# Patient Record
Sex: FEMALE | URBAN - METROPOLITAN AREA
[De-identification: names, ages, dates, MRNs, and addresses within clinical notes are randomized per-mention and may not be internally consistent; named-entity substitution may affect disease eponyms.]

---

## 2022-12-19 ENCOUNTER — HOSPITAL ENCOUNTER (OUTPATIENT)
Dept: PHYSICAL THERAPY | Age: 37
Setting detail: RECURRING SERIES
Discharge: HOME | End: 2022-12-22
Payer: OTHER GOVERNMENT

## 2022-12-19 PROCEDURE — 97161 PT EVAL LOW COMPLEX 20 MIN: CPT

## 2022-12-19 PROCEDURE — 97110 THERAPEUTIC EXERCISES: CPT

## 2022-12-19 ASSESSMENT — PAIN SCALES - GENERAL: PAINLEVEL_OUTOF10: 0

## 2022-12-19 NOTE — THERAPY EVALUATION
Cherylene Azure  : 1985  Primary: Willian Manzo  (Other)  Secondary:  [address] @ [facility] [address] [address]  Phone: [phone]  Fax: [phone] Plan Frequency: one time  a week for 90 days  Plan of Care/Certification Expiration Date: 23    PT Visit Info:    Plan Frequency: one time  a week for 90 days  Plan of Care/Certification Expiration Date: 23    Visit Count:  1                OUTPATIENT PHYSICAL THERAPY:             OP NOTE TYPE: Initial Assessment 2022               Episode  Appt Desk         Treatment Diagnosis:  Lack of coordination (muscle incoordination) (R27.8)  Pelvic floor dysfunction in female (M62.98)  Generalized weakness (M62.81)  Stress incontinence (female) (male) (N39.3)  Medical/Referring Diagnosis:  Stress incontinence (female) (male) [N39.3]  Referring Physician:  WILL Barger NP, MD Orders:  PT Eval and Treat   Return MD Appt:  -  Date of Onset:       Allergies:  Patient has no allergy information on record. Restrictions/Precautions:           Medications Last Reviewed:  2022     SUBJECTIVE   History of Injury/Illness (Reason for Referral):  Ms. Renata Baird is a 31 yo female referred to pelvic floor physical therapy (PFPT) by FRANCISCO JAVIER Barger* 2/2 Stress incontinence (female) (male) [N39.3] Pt reports that symptoms began 1 year ago. Patient reports leakage is getting worse especially when she sneezes. Coughing is hit or miss. She feels like she doesn't have control of pelvic floor. She is 3 years post partum. She is in grad school for OT. She started an exercise program and thought she might have a prolapse. Urinary: Frequency 5-8 x/day, 1 x/night. Positive for stress urinary incontinence, incomplete emptying, urinary hesitancy/intermittency, and nocturia.    Negative for urge urinary incontinence, urinary urgency, urinary frequency, dysuria, hematuria, and nocturnal enuresis. Pt does not use pads for urinary protection; 0 pads per day (PPD). Fluid intake: 16 oz water/day; bladder irritants include: coffee (4 cups a day). Pt does not limit fluid intake due to bladder control. Bowel: Frequency once to twice a day. Positive for pushing/straining with bowel movement and constipation. Negative for pain with bowel movement, fecal incontinence, and incomplete emptying. Pt does not use pads for bowel protection; 0 pads per day (PPD). Use of stool softeners or laxatives? No    Sexual: Pt is  sexually active with male partners. Contraception/birth control: none. negative for dyspareunia. History of sexual abuse: No    Pelvic Organ Prolapse/Pelvic Pain: Location: none    Menstrual History: LMP: 11-27-22. Period Cycle (days):28 . Period duration (days): 5. Period pattern: regular (regular/irregular). Menstrual flow: moderate to heavy (light/moderate/heavy). OB History: Number of pregnancies: 2 Number of vaginal deliveries: 2 Number of c-sections: 0. Initial:     0/10 Post Session:      /10  Past Medical History/Comorbidities:   Ms. Renata Baird  has no past medical history on file. Ms. Renata Baird  has no past surgical history on file. 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  Leisure & Hobbies: none currently, used to do yoga, has get mom strong           Learning:   Does the patient/guardian have any barriers to learning?: No barriers  Will there be a co-learner?: No  What is the preferred language of the patient/guardian?: English  Is an  required?: No  How does the patient/guardian prefer to learn new concepts?: Listening; Reading; Demonstration; Pictures/Videos     Fall Risk Scale:    Duncan Total Score: 0  Duncan Fall Risk: Low (0-24)           OBJECTIVE   OBSERVATION:   External Observations:  Voluntary contraction: [] absent [x] present  Involuntary contraction: [] absent     [x] present  Involuntary relaxation: [] absent     [x] present  Perineal descent at rest: [] absent     [x] present  Perineal descent with bearing: [] absent     [x] present      Pelvic Floor Muscle (PFM) Assessment:  Vaginal vault size: [] decreased     [] increased     [x] WNL  Muscle volume: [] decreased     [x] WNL     [] Defect  PFM tension: [] decreased     [x] increased     [] WNL    Contraction ability:  Voluntary contraction: [] absent     [x] weak     [] moderate      [] strong  Voluntary relaxation: [] absent     [x] partial     [] complete   MMT: 2/5   Overflow: [x] absent     [] min     [] mod     [] severe / Compensatory mm groups include   Levator Avulsion: [x] Negative  [] Positive    Tissue laxity test:  Anterior wall: [] minimum     [] moderate     [] severe      [x] WNL  Posterior wall: [x] minimum     [] moderate     [] severe      [] WNL      Palpation: via vaginal canal   Superficial Pelvic Floor Musculature (PFM): Tender? Intermediate PFM Tender? Deep PFM Tender? Superficial Transverse Perineal [] Right  [] Left  []DNT Deep Transverse Perineal [] Right  [] Left  []DNT Puborectalis [] Right  [] Left  []DNT   Ischiocavernosus [] Right  [] Left  []DNT Compressor Urethra [] Right  [] Left  []DNT Pubococcygeus [] Right  [] Left  []DNT   Bulbocavernosus [] Right  [] Left  []DNT   Ileococcygeus [] Right  [] Left  []DNT       Obturator Internus [] Right  [] Left  []DNT       Coccygeus [] Right  [] Left  []DNT             ASSESSMENT   Initial Assessment:  Therese Bell presents with musculoskeletal limitations including pelvic floor muscle (PFM) tension, reduced PFM Range of Motion (ROM), reduced coordination and awareness of PFM, poor abdominal strength, and decreased pelvic floor muscle (PFM) strength.   These limitations are impairing the patient's ability to properly coordinate perineal elevation and descent for normalized PFM function, contributing to urinary dysfunction and bowel dysfunction. As a result, she is limited in her/his ability to participate in physical activities such as walking, swimming, or other exercise. Problem List: (Impacting functional limitations): Body Structures, Functions, Activity Limitations Requiring Skilled Therapeutic Intervention: Decreased ROM; Decreased ADL status; Decreased endurance; Decreased high-level IADLs; Decreased coordination; Decreased strength     Therapy Prognosis:   Therapy Prognosis: Good     Initial Assessment Complexity:   Decision Making: Low Complexity    PLAN   Effective Dates: 12-19-22 TO Plan of Care/Certification Expiration Date: 03/19/23   Frequency/Duration: Plan Frequency: one time  a week for 90 days   Interventions Planned (Treatment may consist of any combination of the following):    Current Treatment Recommendations: Strengthening; ROM; ADL/Self-care training; Neuromuscular re-education; Manual; Home exercise program; Patient/Caregiver education & training; Equipment evaluation, education, & procurement; Safety education & training; Modalities; Therapeutic activities     Goals: (Goals have been discussed and agreed upon with patient.)  Short-Term Functional Goals: Time Frame: 6 weeks  Pt will demonstrate I with basic PFM HEP to improve awareness, coordination, and timing of PFM. Patient will demonstrate understanding of and ability to teach back appropriate water intake, bladder irritants, toileting frequency, and positioning for improved self-management of symptoms. Patient will demonstrate appropriate co-contraction of the transversus abdominis and pelvic floor muscle group during exhalation in order to reduce IAP and improve functional task performance including exercise. Patient will demonstrate ability to perform diaphragmatic breathing in multiple positions to assist in pelvic floor tension reduction.   Patient will verbalize understanding and demonstrate postural adjustments which facilitate lengthening and reduce overall pelvic floor muscle activity. Discharge Goals: Time Frame: 12 weeks  Patient will be able to perform exercise without urinary leakage for improved participation in recreational activities and ADL's. Patient will be independent in a home dilator and/or graded exposure program, to be performed daily, in order to reduce pain and improved tolerance of tampon use, gynecological screening, and/or sexual intercourse. Patient will demonstrate independence with a home exercise program for aerobic conditioning, core stabilization, and general mobility for independent management of symptoms. Pelvic Floor Impact Questionnaire--short form 7 (PFIQ-7):  Score:  Initial:   Bladder or Urine: 9/100  Bowel or Rectum: 0/100  Vagina or Pelvis: 9/100 Most Recent: X (Date: -- )  Bladder or Urine: X/100  Bowel or Rectum: X/100  Vagina or Pelvis: X/100   Interpretation of Score: Each of the 7 sections is scored on a scale from 0-3; 0 representing \"Not at all\", 3 representing \"Quite a bit\". The mean value is taken from all the answered items, then multiplied by 100 to obtain the scale score, ranging from 0-100. This process is repeated for each column representing bowel, bladder, and pelvic pain. Medical Necessity:   > Patient demonstrates good rehab potential due to higher previous functional level. Reason For Services/Other Comments:  > Patient continues to require skilled intervention due to above mentioned deficits. Total Duration:  Time In: 1000  Time Out: 1100    Regarding [address] therapy, I certify that the treatment plan above will be carried out by a therapist or under their direction. Thank you for this referral,  Elaina Garcia. Jerman Lucero     Referring Physician Signature: FRANCISCO JAVIER Saldaña* No Signature is Required for this note.         Post Session Pain  Charge Capture  PT Visit Info MD Guidelines  MyChart

## 2023-01-12 ENCOUNTER — HOSPITAL ENCOUNTER (OUTPATIENT)
Dept: PHYSICAL THERAPY | Age: 38
Setting detail: RECURRING SERIES
Discharge: HOME | End: 2023-01-15
Payer: OTHER GOVERNMENT

## 2023-01-12 PROCEDURE — 97112 NEUROMUSCULAR REEDUCATION: CPT

## 2023-01-12 PROCEDURE — 97140 MANUAL THERAPY 1/> REGIONS: CPT

## 2023-01-12 NOTE — PROGRESS NOTES
Shellee Favre  : 1985  Primary: Fauma Lyme  (Other)  Secondary:  [address] @ [facility]  [address] [address]  Phone: [phone]  Fax: [phone] Plan Frequency: one time  a week for 90 days  Plan of Care/Certification Expiration Date: 23      PT Visit Info:  Plan Frequency: one time  a week for 90 days  Plan of Care/Certification Expiration Date: 23     Visit Count:  2   OUTPATIENT PHYSICAL THERAPY:OP NOTE TYPE: Treatment Note 2023       Episode  }Appt Desk             Treatment Diagnosis:  Lack of coordination (muscle incoordination) (R27.8)  Pelvic floor dysfunction in female (M62.98)  Generalized weakness (M62.81)  Stress incontinence (female) (male) (N39.3)  Medical/Referring Diagnosis:  Stress incontinence (female) (male) [N39.3]  Referring Physician:  WILL Galindo NP, MD Orders:  PT Eval and Treat   Date of Onset:  No data recorded   Allergies:   Patient has no allergy information on record. Restrictions/Precautions:  No data recorded  No data recorded   Interventions Planned (Treatment may consist of any combination of the following):    Current Treatment Recommendations: Strengthening; ROM; ADL/Self-care training; Neuromuscular re-education; Manual; Home exercise program; Patient/Caregiver education & training; Equipment evaluation, education, & procurement; Safety education & training; Modalities; Therapeutic activities     Subjective Comments: Patient reports she has been attempting the drops. She has noticed improvement. She bought the hip hook to help release the psoas and iliacus. Initial:}     /10Post Session:        /10  Medications Last Reviewed:  2023  Updated Objective Findings:  See evaluation note from today  Treatment   THERAPEUTIC EXERCISE: ( minutes):    Exercises per grid below to improve mobility, strength, and coordination.   Required minimal visual, verbal, manual, and tactile cues to promote proper body alignment, promote proper body posture, promote proper body mechanics, and promote proper body breathing techniques. Progressed resistance, range, repetitions, and complexity of movement as indicated. Date:  12-19-22 Date:   Date:     Activity/Exercise Parameters Parameters Parameters   Patient Education Discussed HEP and POC     Drops Reviewed for HEP              All exercises performed with emphasis on kegel and breathing in order improve coordination, awareness and to minimize symptoms. MANUAL THERAPY: (10 minutes): to improve soft tissue tone    Date Type Location Comments   1/12/2023 Internal assessment/treatment Via vaginal canal  CTM                                       (Used abbreviations: MET - muscle energy technique; SCS- Strain counter strain; CTM-Connective tissue mobilizations; CR- Contract/Relax; SP- Sustained pressure; PIT- Positional inhibition techniques; STM Soft -tissue mobilization; MM- Myofascial mobilization; TrP-Trigger point release; IASTM- Instrument assisted soft tissue mobilizations, TDN-Trigger point dry needling)    Pt gives verbal consent to internal vaginal assessment/treatment without chaperon present. NEURO REEDUCATION: (39) to improve control and coordination of pelvic floor     Date:  1-12-23 Date:   Date:     Activity/Exercise Parameters Parameters Parameters   Biofeedback With sEMG was utilized for coordination of PFM. Supine, sitting, standing.                                                THERAPEUTIC ACTIVITY: ( minutes):     TA Educational Topic Notes Date Completed   Pathology/Anatomy/PFM Function     Bladder health education     Urinary urge suppression     The knack     Voiding strategies     Nocturia tips     Bowel/Bladder log     Bowel health education     Constipation care     Diarrhea/Fecal leakage     Colonic massage     Toilet positioning     Defecation dynamics     Sources of fiber     Return to intercourse/Dilator training Sexual positioning     Lubricants/vaginal moisturizers     Vaginal irritants     Body mechanics     Posture/ergonomics     Diaphragmatic breathing     Resources and technology          Treatment/Session Summary:    Treatment Assessment: Henri Gonzalez shows improvement in pelvic floor relaxation with the use of biofeedback. In both supine and the sitting position initial read was 3.0-5.0 uV, with the ability to achieve 0.1 uV after visual and verbal cues. In standing, patient with greater difficulty achieving resting position. She had an initial reading of 5.0-7.0 uV, with the ability to achieve no less than a 3.0 uV read. Patient educated on positioning of pelvic floor and instructed to practice Drop exercise in multiple positions. Communication/Consultation:  None today  Equipment provided today:  None  Recommendations/Intent for next treatment session: Next visit will focus on biofeedback. Total Treatment Billable Duration:  45 minutes neuro re-ed, 10 minutes manual therapy   Time In: 0900  Time Out: Mitchell Navarrete 148.  Zainab Noel, PT       Charge Capture  }Post Session Pain  PT Visit Info  [facility]  MD Guidelines  Scanned Media  Benefits  MyChart    Future Appointments   Date Time Provider Trudi Orellana   1/16/2023  9:00 AM [facility]   1/26/2023  8:00 AM Jolene Dowling, PT [facility]   1/31/2023  2:00 PM [facility]   2/7/2023  2:00 PM [facility]   2/14/2023  2:00 PM [facility]

## 2023-01-16 ENCOUNTER — HOSPITAL ENCOUNTER (OUTPATIENT)
Dept: PHYSICAL THERAPY | Age: 38
Setting detail: RECURRING SERIES
Discharge: HOME | End: 2023-01-19
Payer: OTHER GOVERNMENT

## 2023-01-16 PROCEDURE — 97140 MANUAL THERAPY 1/> REGIONS: CPT

## 2023-01-16 PROCEDURE — 97110 THERAPEUTIC EXERCISES: CPT

## 2023-01-16 NOTE — PROGRESS NOTES
[facility] Setting  : 1985  Primary: Anastacia Aburto  (Other)  Secondary:  [address] @ [facility] [address] [address]  Phone: [phone]  Fax: [phone] Plan Frequency: one time  a week for 90 days  Plan of Care/Certification Expiration Date: 23      PT Visit Info:  Plan Frequency: one time  a week for 90 days  Plan of Care/Certification Expiration Date: 23     Visit Count:  3   OUTPATIENT PHYSICAL THERAPY:OP NOTE TYPE: Treatment Note 2023       Episode  }Appt Desk             Treatment Diagnosis:  Lack of coordination (muscle incoordination) (R27.8)  Pelvic floor dysfunction in female (M62.98)  Generalized weakness (M62.81)  Stress incontinence (female) (male) (N39.3)  Medical/Referring Diagnosis:  Stress incontinence (female) (male) [N39.3]  Referring Physician:  WILL Knight NP, MD Orders:  PT Eval and Treat   Date of Onset:  No data recorded   Allergies:   Patient has no allergy information on record. Restrictions/Precautions:  No data recorded  No data recorded   Interventions Planned (Treatment may consist of any combination of the following):    Current Treatment Recommendations: Strengthening; ROM; ADL/Self-care training; Neuromuscular re-education; Manual; Home exercise program; Patient/Caregiver education & training; Equipment evaluation, education, & procurement; Safety education & training; Modalities; Therapeutic activities     Subjective Comments: Patient is attempting in lying, sitting, and standing positioning following biofeedback from last session. Standing position is most difficult. Has had leaking when performing drops. Some urinary retention feelings.       Initial:}     /10Post Session:        /10  Medications Last Reviewed:  2023  Updated Objective Findings:  See evaluation note from today  Treatment   THERAPEUTIC EXERCISE: (17 minutes):    Exercises per grid below to improve mobility, strength, and coordination. Required minimal visual, verbal, manual, and tactile cues to promote proper body alignment, promote proper body posture, promote proper body mechanics, and promote proper body breathing techniques. Progressed resistance, range, repetitions, and complexity of movement as indicated. Date:  12-19-22 Date:  1-16-23 Date:     Activity/Exercise Parameters Parameters Parameters   Patient Education Discussed HEP and POC Discussed progress, educated on expected leakage with improvement in pelvic floor length/tension    Drops Reviewed for HEP     Butterfly    2 x 30 seconds    Figure 4    2 x 30 seconds B    Happy Baby     2 x 30 seconds B    Gabriella Pose    2 x 30 seconds, all directions       All exercises performed with emphasis on kegel and breathing in order improve coordination, awareness and to minimize symptoms. MANUAL THERAPY: (38 minutes): to improve soft tissue tone    Date Type Location Comments   1/16/2023 Internal assessment/treatment Via vaginal canal  CTM, SP                                       (Used abbreviations: MET - muscle energy technique; SCS- Strain counter strain; CTM-Connective tissue mobilizations; CR- Contract/Relax; SP- Sustained pressure; PIT- Positional inhibition techniques; STM Soft -tissue mobilization; MM- Myofascial mobilization; TrP-Trigger point release; IASTM- Instrument assisted soft tissue mobilizations, TDN-Trigger point dry needling)    Pt gives verbal consent to internal vaginal assessment/treatment without chaperon present. NEURO REEDUCATION: ( minutes) to improve control and coordination of pelvic floor     Date:  1-12-23 Date:   Date:     Activity/Exercise Parameters Parameters Parameters   Biofeedback With sEMG was utilized for coordination of PFM. Supine, sitting, standing.                                                THERAPEUTIC ACTIVITY: ( minutes):     TA Educational Topic Notes Date Completed   Pathology/Anatomy/PFM Function     Bladder health education     Urinary urge suppression     The knack     Voiding strategies     Nocturia tips     Bowel/Bladder log     Bowel health education     Constipation care     Diarrhea/Fecal leakage     Colonic massage     Toilet positioning     Defecation dynamics     Sources of fiber     Return to intercourse/Dilator training     Sexual positioning     Lubricants/vaginal moisturizers     Vaginal irritants     Body mechanics     Posture/ergonomics     Diaphragmatic breathing     Resources and technology          Treatment/Session Summary:    Treatment Assessment: Daly Valencia shows improvement with pelvic floor relaxation. Patient continues to have tightness in deepest layer of pelvic floor. Communication/Consultation:  None today  Equipment provided today:  None  Recommendations/Intent for next treatment session: Next visit will focus on coordination. Total Treatment Billable Duration:  17 minutes there ex, 38 minutes manual therapy        Obadiah Presume.  Vidal Alvarado, PT       Charge Capture  }Post Session Pain  PT Visit Info  [address] Portal  MD Guidelines  Scanned Media  Benefits  MyChart    Future Appointments   Date Time Provider Trudi Orellana   1/26/2023  8:00 AM [facility]   1/31/2023  2:00 PM [facility]   2/7/2023  2:00 PM [facility]   2/14/2023  2:00 PM Corrine Austni, PT [facility]

## 2023-01-26 ENCOUNTER — HOSPITAL ENCOUNTER (OUTPATIENT)
Dept: PHYSICAL THERAPY | Age: 38
Setting detail: RECURRING SERIES
Discharge: HOME | End: 2023-01-29
Payer: OTHER GOVERNMENT

## 2023-01-26 PROCEDURE — 97140 MANUAL THERAPY 1/> REGIONS: CPT

## 2023-01-26 PROCEDURE — 97110 THERAPEUTIC EXERCISES: CPT

## 2023-01-26 NOTE — PROGRESS NOTES
Jean Pierre Barone  : 1985  Primary: Barrett Schultz  (Other)  Secondary:  [address] @ [employer] [facility] [address] [address]  Phone: [phone]  Fax: [phone] Plan Frequency: one time  a week for 90 days  Plan of Care/Certification Expiration Date: 23      PT Visit Info:  Plan Frequency: one time  a week for 90 days  Plan of Care/Certification Expiration Date: 23     Visit Count:  4   OUTPATIENT PHYSICAL THERAPY:OP NOTE TYPE: Treatment Note 2023       Episode  }Appt Desk             Treatment Diagnosis:  Lack of coordination (muscle incoordination) (R27.8)  Pelvic floor dysfunction in female (M62.98)  Generalized weakness (M62.81)  Stress incontinence (female) (male) (N39.3)  Medical/Referring Diagnosis:  Stress incontinence (female) (male) [N39.3]  Referring Physician:  WILL Carias NP, MD Orders:  PT Eval and Treat   Date of Onset:  No data recorded   Allergies:   Patient has no allergy information on record. Restrictions/Precautions:  No data recorded  No data recorded   Interventions Planned (Treatment may consist of any combination of the following):    Current Treatment Recommendations: Strengthening; ROM; ADL/Self-care training; Neuromuscular re-education; Manual; Home exercise program; Patient/Caregiver education & training; Equipment evaluation, education, & procurement; Safety education & training; Modalities; Therapeutic activities     Subjective Comments: Patient has had more abdominal and low back pain due to menstruation this week. She said her flow is heavier than it has been. She is still performing exercises and stretches.       Initial:}     /10Post Session:        /10  Medications Last Reviewed:  2023  Updated Objective Findings:    Ms. Ankit Newman is a 29 yo female referred to pelvic floor physical therapy (PFPT) by FRANCISCO JAVIER Carias* 2/2 Stress incontinence (female) (male) [N39.3] Pt reports that symptoms began 1 year ago. Patient reports leakage is getting worse especially when she sneezes. Coughing is hit or miss. She feels like she doesn't have control of pelvic floor. She is 3 years post partum. She is in grad school for OT. She started an exercise program and thought she might have a prolapse. Urinary: Frequency 5-8 x/day, 1 x/night. Positive for stress urinary incontinence, incomplete emptying, urinary hesitancy/intermittency, and nocturia. Negative for urge urinary incontinence, urinary urgency, urinary frequency, dysuria, hematuria, and nocturnal enuresis. Pt does not use pads for urinary protection; 0 pads per day (PPD). Fluid intake: 16 oz water/day; bladder irritants include: coffee (4 cups a day). Pt does not limit fluid intake due to bladder control. Bowel: Frequency once to twice a day. Positive for pushing/straining with bowel movement and constipation. Negative for pain with bowel movement, fecal incontinence, and incomplete emptying. Pt does not use pads for bowel protection; 0 pads per day (PPD). Use of stool softeners or laxatives? No     Sexual: Pt is  sexually active with male partners. Contraception/birth control: none. negative for dyspareunia. History of sexual abuse: No     Pelvic Organ Prolapse/Pelvic Pain: Location: none     Menstrual History: LMP: 11-27-22. Period Cycle (days):28 . Period duration (days): 5. Period pattern: regular (regular/irregular). Menstrual flow: moderate to heavy (light/moderate/heavy). OB History: Number of pregnancies: 2 Number of vaginal deliveries: 2 Number of c-sections: 0.   External Observations:  Voluntary contraction: [] absent     [x] present  Involuntary contraction: [] absent     [x] present  Involuntary relaxation: [] absent     [x] present  Perineal descent at rest: [] absent     [x] present  Perineal descent with bearing: [] absent     [x] present        Pelvic Floor Muscle (PFM) Assessment:  Vaginal vault size: [] decreased     [] increased     [x] WNL  Muscle volume: [] decreased     [x] WNL     [] Defect  PFM tension: [] decreased     [x] increased     [] WNL     Contraction ability:  Voluntary contraction: [] absent     [x] weak     [] moderate      [] strong  Voluntary relaxation: [] absent     [x] partial     [] complete   MMT: 2/5   Overflow: [x] absent     [] min     [] mod     [] severe / Compensatory mm groups include   Levator Avulsion: [x] Negative  [] Positive     Tissue laxity test:  Anterior wall: [] minimum     [] moderate     [] severe      [x] WNL  Posterior wall: [x] minimum     [] moderate     [] severe      [] WNL        Palpation: via vaginal canal   Superficial Pelvic Floor Musculature (PFM): Tender? Intermediate PFM Tender? Deep PFM Tender? Superficial Transverse Perineal [] Right  [] Left  []DNT Deep Transverse Perineal [] Right  [] Left  []DNT Puborectalis [] Right  [] Left  []DNT   Ischiocavernosus [] Right  [] Left  []DNT Compressor Urethra [] Right  [] Left  []DNT Pubococcygeus [] Right  [] Left  []DNT   Bulbocavernosus [] Right  [] Left  []DNT     Ileococcygeus [] Right  [] Left  []DNT           Obturator Internus [] Right  [] Left  []DNT           Coccygeus [] Right  [] Left  []DNT           Treatment   THERAPEUTIC EXERCISE: (25 minutes):    Exercises per grid below to improve mobility, strength, and coordination. Required minimal visual, verbal, manual, and tactile cues to promote proper body alignment, promote proper body posture, promote proper body mechanics, and promote proper body breathing techniques. Progressed resistance, range, repetitions, and complexity of movement as indicated.    Date:  12-19-22 Date:  1-16-23 Date:  1-26-23   Activity/Exercise Parameters Parameters Parameters   Patient Education Discussed HEP and POC Discussed progress, educated on expected leakage with improvement in pelvic floor length/tension    Drops Reviewed for HEP     Butterfly    2 x 30 seconds 2x30 seconds   Figure 4    2 x 30 seconds B    Happy Baby     2 x 30 seconds  2 x 30 seconds    Gabriella Pose    2 x 30 seconds, all directions    Pelvic tilt   X15, 5 sec hold   2nd position bridge   X20     Clamshells   X20 B                    All exercises performed with emphasis on kegel and breathing in order improve coordination, awareness and to minimize symptoms. MANUAL THERAPY: (30 minutes): to improve soft tissue tone    Date Type Location Comments   1/26/2023 Internal assessment/treatment Via vaginal canal  CTM, SP                                       (Used abbreviations: MET - muscle energy technique; SCS- Strain counter strain; CTM-Connective tissue mobilizations; CR- Contract/Relax; SP- Sustained pressure; PIT- Positional inhibition techniques; STM Soft -tissue mobilization; MM- Myofascial mobilization; TrP-Trigger point release; IASTM- Instrument assisted soft tissue mobilizations, TDN-Trigger point dry needling)    Pt gives verbal consent to internal vaginal assessment/treatment without chaperon present. NEURO REEDUCATION: ( minutes) to improve control and coordination of pelvic floor     Date:  1-12-23 Date:   Date:     Activity/Exercise Parameters Parameters Parameters   Biofeedback With sEMG was utilized for coordination of PFM. Supine, sitting, standing.                                                THERAPEUTIC ACTIVITY: ( minutes):     TA Educational Topic Notes Date Completed   Pathology/Anatomy/PFM Function     Bladder health education     Urinary urge suppression     The knack     Voiding strategies     Nocturia tips     Bowel/Bladder log     Bowel health education     Constipation care     Diarrhea/Fecal leakage     Colonic massage     Toilet positioning     Defecation dynamics     Sources of fiber     Return to intercourse/Dilator training     Sexual positioning     Lubricants/vaginal moisturizers     Vaginal irritants     Body mechanics     Posture/ergonomics Diaphragmatic breathing     Resources and technology          Treatment/Session Summary:    Treatment Assessment: Desmond Failing shows improvement with pelvic floor relaxation when assessed manually. Some tension still in deepest layer of pelvic floor. Progression included core stabilization and supporting muscle strengthening this session. Communication/Consultation:  None today  Equipment provided today:  None  Recommendations/Intent for next treatment session: Next visit will focus on coordination and strengthening. Total Treatment Billable Duration:  25 minutes there ex, 30 minutes manual   Time In: 0800  Time Out: [address] Gallardo, PT       Charge Capture  }Post Session Pain  PT Visit Info  [facility] Portal  MD Guidelines  Scanned Media  Benefits  MyChart    Future Appointments   Date Time Provider Trudi Orellana   1/31/2023  2:00 PM Juju Tim, PT [facility]   2/7/2023  2:00 PM [facility]   2/14/2023  2:00 PM Juju Tmi, PT [facility]

## 2023-01-31 ENCOUNTER — HOSPITAL ENCOUNTER (OUTPATIENT)
Dept: PHYSICAL THERAPY | Age: 38
Setting detail: RECURRING SERIES
Discharge: HOME | End: 2023-02-03
Payer: OTHER GOVERNMENT

## 2023-01-31 PROCEDURE — 97110 THERAPEUTIC EXERCISES: CPT

## 2023-01-31 PROCEDURE — 97140 MANUAL THERAPY 1/> REGIONS: CPT

## 2023-01-31 NOTE — PROGRESS NOTES
Chandra Zuniga  : 1985  Primary: [address]  (Other)  Secondary:  [address] @ [employer] [facility] [address] [address]  Phone: [phone]  Fax: [phone] Plan Frequency: one time  a week for 90 days  Plan of Care/Certification Expiration Date: 23      PT Visit Info:  Plan Frequency: one time  a week for 90 days  Plan of Care/Certification Expiration Date: 23     Visit Count:  5   OUTPATIENT PHYSICAL THERAPY:OP NOTE TYPE: Treatment Note 2023       Episode  }Appt Desk             Treatment Diagnosis:  Lack of coordination (muscle incoordination) (R27.8)  Pelvic floor dysfunction in female (M62.98)  Generalized weakness (M62.81)  Stress incontinence (female) (male) (N39.3)  Medical/Referring Diagnosis:  Stress incontinence (female) (male) [N39.3]  Referring Physician:  WILL Antonio NP, MD Orders:  PT Eval and Treat   Date of Onset:  No data recorded   Allergies:   Patient has no allergy information on record. Restrictions/Precautions:  No data recorded  No data recorded   Interventions Planned (Treatment may consist of any combination of the following):    Current Treatment Recommendations: Strengthening; ROM; ADL/Self-care training; Neuromuscular re-education; Manual; Home exercise program; Patient/Caregiver education & training; Equipment evaluation, education, & procurement; Safety education & training; Modalities; Therapeutic activities     Subjective Comments: Patient reports HEP is going well and she was sore for a few days after completing clamshells. She reports becoming more aware of PFM and spending less time using the restroom because she feels she is emptying fully.       Initial:}     /10Post Session:        /10  Medications Last Reviewed:  2023  Updated Objective Findings:    Ms. Delio Jean is a 31 yo female referred to pelvic floor physical therapy (PFPT) by FRANCISCO JAVIER Antonio* 2/2 Stress incontinence (female) (male) [N39.3] Pt reports that symptoms began 1 year ago. Patient reports leakage is getting worse especially when she sneezes. Coughing is hit or miss. She feels like she doesn't have control of pelvic floor. She is 3 years post partum. She is in grad school for OT. She started an exercise program and thought she might have a prolapse. Urinary: Frequency 5-8 x/day, 1 x/night. Positive for stress urinary incontinence, incomplete emptying, urinary hesitancy/intermittency, and nocturia. Negative for urge urinary incontinence, urinary urgency, urinary frequency, dysuria, hematuria, and nocturnal enuresis. Pt does not use pads for urinary protection; 0 pads per day (PPD). Fluid intake: 16 oz water/day; bladder irritants include: coffee (4 cups a day). Pt does not limit fluid intake due to bladder control. Bowel: Frequency once to twice a day. Positive for pushing/straining with bowel movement and constipation. Negative for pain with bowel movement, fecal incontinence, and incomplete emptying. Pt does not use pads for bowel protection; 0 pads per day (PPD). Use of stool softeners or laxatives? No     Sexual: Pt is  sexually active with male partners. Contraception/birth control: none. negative for dyspareunia. History of sexual abuse: No     Pelvic Organ Prolapse/Pelvic Pain: Location: none     Menstrual History: LMP: 11-27-22. Period Cycle (days):28 . Period duration (days): 5. Period pattern: regular (regular/irregular). Menstrual flow: moderate to heavy (light/moderate/heavy). OB History: Number of pregnancies: 2 Number of vaginal deliveries: 2 Number of c-sections: 0.   External Observations:  Voluntary contraction: [] absent     [x] present  Involuntary contraction: [] absent     [x] present  Involuntary relaxation: [] absent     [x] present  Perineal descent at rest: [] absent     [x] present  Perineal descent with bearing: [] absent     [x] present        Pelvic Floor Muscle (PFM) Assessment:  Vaginal vault size: [] decreased     [] increased     [x] WNL  Muscle volume: [] decreased     [x] WNL     [] Defect  PFM tension: [] decreased     [x] increased     [] WNL     Contraction ability:  Voluntary contraction: [] absent     [] weak     [x] moderate      [] strong  Voluntary relaxation: [] absent     [] partial     [x] complete   MMT: 3+/5   Overflow: [x] absent     [] min     [] mod     [] severe / Compensatory mm groups include   Levator Avulsion: [x] Negative  [] Positive     Tissue laxity test:  Anterior wall: [] minimum     [] moderate     [] severe      [x] WNL  Posterior wall: [x] minimum     [] moderate     [] severe      [] WNL        Palpation: via vaginal canal   Superficial Pelvic Floor Musculature (PFM): Tender? Intermediate PFM Tender? Deep PFM Tender? Superficial Transverse Perineal [] Right  [] Left  []DNT Deep Transverse Perineal [] Right  [] Left  []DNT Puborectalis [] Right  [] Left  []DNT   Ischiocavernosus [] Right  [] Left  []DNT Compressor Urethra [] Right  [] Left  []DNT Pubococcygeus [] Right  [] Left  []DNT   Bulbocavernosus [] Right  [] Left  []DNT     Ileococcygeus [] Right  [] Left  []DNT           Obturator Internus [] Right  [] Left  []DNT           Coccygeus [] Right  [] Left  []DNT           Treatment   THERAPEUTIC EXERCISE: (25 minutes):    Exercises per grid below to improve mobility, strength, and coordination. Required minimal visual, verbal, manual, and tactile cues to promote proper body alignment, promote proper body posture, promote proper body mechanics, and promote proper body breathing techniques. Progressed resistance, range, repetitions, and complexity of movement as indicated.    Date:  12-19-22 Date:  1-16-23 Date:  1-26-23 Date:  1-31-23   Activity/Exercise Parameters Parameters Parameters    Patient Education Discussed HEP and POC Discussed progress, educated on expected leakage with improvement in pelvic floor length/tension     Drops Reviewed for HEP      Butterfly    2 x 30 seconds 2x30 seconds    Figure 4    2 x 30 seconds B     Happy Baby     2 x 30 seconds  2 x 30 seconds     Gabriella Pose    2 x 30 seconds, all directions     Pelvic tilt   X15, 5 sec hold    2nd position bridge   X20   X20   Clamshells   X20 B      Dead Bug      x20   Bird Dog      X20       All exercises performed with emphasis on kegel and breathing in order improve coordination, awareness and to minimize symptoms. MANUAL THERAPY: (30 minutes): to improve soft tissue tone    Date Type Location Comments   1/31/2023 Internal assessment/treatment Via vaginal canal  CTM, SP                                       (Used abbreviations: MET - muscle energy technique; SCS- Strain counter strain; CTM-Connective tissue mobilizations; CR- Contract/Relax; SP- Sustained pressure; PIT- Positional inhibition techniques; STM Soft -tissue mobilization; MM- Myofascial mobilization; TrP-Trigger point release; IASTM- Instrument assisted soft tissue mobilizations, TDN-Trigger point dry needling)    Pt gives verbal consent to internal vaginal assessment/treatment without chaperon present. NEURO REEDUCATION: ( minutes) to improve control and coordination of pelvic floor     Date:  1-12-23 Date:   Date:     Activity/Exercise Parameters Parameters Parameters   Biofeedback With sEMG was utilized for coordination of PFM. Supine, sitting, standing.                                                THERAPEUTIC ACTIVITY: ( minutes):     TA Educational Topic Notes Date Completed   Pathology/Anatomy/PFM Function     Bladder health education     Urinary urge suppression     The knack     Voiding strategies     Nocturia tips     Bowel/Bladder log     Bowel health education     Constipation care     Diarrhea/Fecal leakage     Colonic massage     Toilet positioning     Defecation dynamics     Sources of fiber     Return to intercourse/Dilator training     Sexual positioning Lubricants/vaginal moisturizers     Vaginal irritants     Body mechanics     Posture/ergonomics     Diaphragmatic breathing     Resources and technology          Treatment/Session Summary:    Treatment Assessment: Marianna shows improvement with relaxation of PFM, as well as improved kegel. Additional strengthening exercises given this session with written and pictoral handout for HEP. Communication/Consultation:  None today  Equipment provided today:  None  Recommendations/Intent for next treatment session: Next visit will focus on coordination and strengthening. Total Treatment Billable Duration:  25 minutes there ex, 30 minutes manual        G[facility] [employer].  Anjali Gallardo, PT       Charge Capture  }Post Session Pain  PT Visit Info  [facility] Portal  MD Guidelines  Scanned Media  Benefits  MyChart    Future Appointments   Date Time Provider Trudi Orellana   2/7/2023  2:00 PM Therese Topete [facility]   2/14/2023  [facility]

## 2023-02-07 ENCOUNTER — HOSPITAL ENCOUNTER (OUTPATIENT)
Dept: PHYSICAL THERAPY | Age: 38
Setting detail: RECURRING SERIES
Discharge: HOME | End: 2023-02-10
Payer: OTHER GOVERNMENT

## 2023-02-07 PROCEDURE — 97110 THERAPEUTIC EXERCISES: CPT

## 2023-02-07 NOTE — PROGRESS NOTES
Pritesh Chapa  : 1985  Primary: Krystian Paulino  (Other)  Secondary:  [address] @ [employer] [facility] [address] [address]  Phone: [phone]  Fax: [phone] Plan Frequency: one time  a week for 90 days  Plan of Care/Certification Expiration Date: 23      PT Visit Info:  Plan Frequency: one time  a week for 90 days  Plan of Care/Certification Expiration Date: 23     Visit Count:  6   OUTPATIENT PHYSICAL THERAPY:OP NOTE TYPE: Treatment Note 2023       Episode  }Appt Desk             Treatment Diagnosis:  Lack of coordination (muscle incoordination) (R27.8)  Pelvic floor dysfunction in female (M62.98)  Generalized weakness (M62.81)  Stress incontinence (female) (male) (N39.3)  Medical/Referring Diagnosis:  Stress incontinence (female) (male) [N39.3]  Referring Physician:  WILL Hooker NP, MD Orders:  PT Eval and Treat   Date of Onset:  No data recorded   Allergies:   Patient has no allergy information on record. Restrictions/Precautions:  No data recorded  No data recorded   Interventions Planned (Treatment may consist of any combination of the following):    Current Treatment Recommendations: Strengthening; ROM; ADL/Self-care training; Neuromuscular re-education; Manual; Home exercise program; Patient/Caregiver education & training; Equipment evaluation, education, & procurement; Safety education & training; Modalities; Therapeutic activities     Subjective Comments: Patient reports lower back pain and cramping. She has been focused on strengthening exercises and not as much on stretching. Initial:}     /10Post Session:        /10  Medications Last Reviewed:  2023  Updated Objective Findings:    Ms. Marianna Thomson is a 29 yo female referred to pelvic floor physical therapy (PFPT) by FRANCISCO JAVIER Hooker* 2/2 Stress incontinence (female) (male) [N39.3] Pt reports that symptoms began 1 year ago.  Patient reports leakage is getting worse especially when she sneezes. Coughing is hit or miss. She feels like she doesn't have control of pelvic floor. She is 3 years post partum. She is in grad school for OT. She started an exercise program and thought she might have a prolapse. Urinary: Frequency 5-8 x/day, 1 x/night. Positive for stress urinary incontinence, incomplete emptying, urinary hesitancy/intermittency, and nocturia. Negative for urge urinary incontinence, urinary urgency, urinary frequency, dysuria, hematuria, and nocturnal enuresis. Pt does not use pads for urinary protection; 0 pads per day (PPD). Fluid intake: 16 oz water/day; bladder irritants include: coffee (4 cups a day). Pt does not limit fluid intake due to bladder control. Bowel: Frequency once to twice a day. Positive for pushing/straining with bowel movement and constipation. Negative for pain with bowel movement, fecal incontinence, and incomplete emptying. Pt does not use pads for bowel protection; 0 pads per day (PPD). Use of stool softeners or laxatives? No     Sexual: Pt is  sexually active with male partners. Contraception/birth control: none. negative for dyspareunia. History of sexual abuse: No     Pelvic Organ Prolapse/Pelvic Pain: Location: none     Menstrual History: LMP: 11-27-22. Period Cycle (days):28 . Period duration (days): 5. Period pattern: regular (regular/irregular). Menstrual flow: moderate to heavy (light/moderate/heavy). OB History: Number of pregnancies: 2 Number of vaginal deliveries: 2 Number of c-sections: 0.   External Observations:  Voluntary contraction: [] absent     [x] present  Involuntary contraction: [] absent     [x] present  Involuntary relaxation: [] absent     [x] present  Perineal descent at rest: [] absent     [x] present  Perineal descent with bearing: [] absent     [x] present        Pelvic Floor Muscle (PFM) Assessment:  Vaginal vault size: [] decreased     [] increased     [x] WNL  Muscle volume: [] decreased     [x] WNL     [] Defect  PFM tension: [] decreased     [x] increased     [] WNL     Contraction ability:  Voluntary contraction: [] absent     [] weak     [x] moderate      [] strong  Voluntary relaxation: [] absent     [] partial     [x] complete   MMT: 3+/5   Overflow: [x] absent     [] min     [] mod     [] severe / Compensatory mm groups include   Levator Avulsion: [x] Negative  [] Positive     Tissue laxity test:  Anterior wall: [] minimum     [] moderate     [] severe      [x] WNL  Posterior wall: [x] minimum     [] moderate     [] severe      [] WNL        Palpation: via vaginal canal   Superficial Pelvic Floor Musculature (PFM): Tender? Intermediate PFM Tender? Deep PFM Tender?   Superficial Transverse Perineal [] Right  [] Left  []DNT Deep Transverse Perineal [] Right  [] Left  []DNT Puborectalis [] Right  [] Left  []DNT   Ischiocavernosus [] Right  [] Left  []DNT Compressor Urethra [] Right  [] Left  []DNT Pubococcygeus [] Right  [] Left  []DNT   Bulbocavernosus [] Right  [] Left  []DNT     Ileococcygeus [] Right  [] Left  []DNT           Obturator Internus [] Right  [] Left  []DNT           Coccygeus [] Right  [] Left  []DNT           Treatment   THERAPEUTIC EXERCISE: (45 minutes):    Exercises per grid below to improve mobility, strength, and coordination.  Required minimal visual, verbal, manual, and tactile cues to promote proper body alignment, promote proper body posture, promote proper body mechanics, and promote proper body breathing techniques.  Progressed resistance, range, repetitions, and complexity of movement as indicated.   Date:  12-19-22 Date:  1-16-23 Date:  1-26-23 Date:  1-31-23 Date:  2-7-23   Activity/Exercise Parameters Parameters Parameters     Patient Education Discussed HEP and POC Discussed progress, educated on expected leakage with improvement in pelvic floor length/tension   Education provided on glute and core activation, and performance of  HEP.   Drops Reviewed for HEP       Butterfly    2 x 30 seconds 2x30 seconds  30 sec holds   Figure 4    2 x 30 seconds B      Happy Baby     2 x 30 seconds  2 x 30 seconds      Gabriella Pose    2 x 30 seconds, all directions   30 sec holds forward and lateral   Pelvic tilt   X15, 5 sec hold  X20, 5 sec hold   2nd position bridge   X20   X20    Clamshells   X20 B       Dead Bug      x20    Bird Dog      X20     Posterior pelvic tilt + march     X20     Prone glute extensions     X10 B        All exercises performed with emphasis on kegel and breathing in order improve coordination, awareness and to minimize symptoms. MANUAL THERAPY: (0 minutes): to improve soft tissue tone    Date Type Location Comments   2/7/2023 Internal assessment/treatment Via vaginal canal                                         (Used abbreviations: MET - muscle energy technique; SCS- Strain counter strain; CTM-Connective tissue mobilizations; CR- Contract/Relax; SP- Sustained pressure; PIT- Positional inhibition techniques; STM Soft -tissue mobilization; MM- Myofascial mobilization; TrP-Trigger point release; IASTM- Instrument assisted soft tissue mobilizations, TDN-Trigger point dry needling)    Pt gives verbal consent to internal vaginal assessment/treatment without chaperon present. NEURO REEDUCATION: (0 minutes) to improve control and coordination of pelvic floor     Date:  1-12-23 Date:   Date:     Activity/Exercise Parameters Parameters Parameters   Biofeedback With sEMG was utilized for coordination of PFM. Supine, sitting, standing.                                                THERAPEUTIC ACTIVITY: (0 minutes):     TA Educational Topic Notes Date Completed   Pathology/Anatomy/PFM Function     Bladder health education     Urinary urge suppression     The knack     Voiding strategies     Nocturia tips     Bowel/Bladder log     Bowel health education     Constipation care     Diarrhea/Fecal leakage     Colonic massage     Toilet positioning     Defecation dynamics     Sources of fiber     Return to intercourse/Dilator training     Sexual positioning     Lubricants/vaginal moisturizers     Vaginal irritants     Body mechanics     Posture/ergonomics     Diaphragmatic breathing     Resources and technology          Treatment/Session Summary:    Treatment Assessment: Marianna demonstrates decreased glute strength L > R and increased lumbar paraspinal activation. Great difficulty noted with prone glute extensions. Rianna Wu will benefit from skilled PT to address strength and coordination of pelvic floor and supporting musculature. Communication/Consultation:  None today  Equipment provided today:  None  Recommendations/Intent for next treatment session: Next visit will focus on coordination and strengthening. Total Treatment Billable Duration:  45 minutes there ex  Time In: 0439  Time Out: [facility].  Pradip Abernathy, PT       Charge Capture  }Post Session Pain  PT Visit Info  [address] Portal  MD Guidelines  Scanned Media  Benefits  MyChart    Future Appointments   Date Time Provider Trudi Orellana   2/14/2023  2:00 PM Shahla Machuca, PT [facility]

## 2023-02-14 ENCOUNTER — HOSPITAL ENCOUNTER (OUTPATIENT)
Dept: PHYSICAL THERAPY | Age: 38
Setting detail: RECURRING SERIES
Discharge: HOME | End: 2023-02-17
Payer: OTHER GOVERNMENT

## 2023-02-14 PROCEDURE — 97110 THERAPEUTIC EXERCISES: CPT

## 2023-02-14 NOTE — PROGRESS NOTES
Miky Real  : 1985  Primary: Mejia Johns  (Other)  Secondary:  [address] @ [employer] [facility] [address] [address]  Phone: [phone]  Fax: [phone] Plan Frequency: one time  a week for 90 days  Plan of Care/Certification Expiration Date: 23      PT Visit Info:  Plan Frequency: one time  a week for 90 days  Plan of Care/Certification Expiration Date: 23     Visit Count:  7   OUTPATIENT PHYSICAL THERAPY:OP NOTE TYPE: Treatment Note 2023       Episode  }Appt Desk             Treatment Diagnosis:  Lack of coordination (muscle incoordination) (R27.8)  Pelvic floor dysfunction in female (M62.98)  Generalized weakness (M62.81)  Stress incontinence (female) (male) (N39.3)  Medical/Referring Diagnosis:  Stress incontinence (female) (male) [N39.3]  Referring Physician:  Chapin OfficerWILL - NP  MD Orders:  PT Eval and Treat   Date of Onset:  No data recorded   Allergies:   Patient has no allergy information on record. Restrictions/Precautions:  No data recorded  No data recorded   Interventions Planned (Treatment may consist of any combination of the following):    Current Treatment Recommendations: Strengthening; ROM; ADL/Self-care training; Neuromuscular re-education; Manual; Home exercise program; Patient/Caregiver education & training; Equipment evaluation, education, & procurement; Safety education & training; Modalities; Therapeutic activities     Subjective Comments: Patient reports an extreme bout of constipation Friday and she hasn't had a full bowel movement since. She has tried miralax and its not working. She is increasing water intake and trying to be mindful of diet. She hasnt been able to perform her strengthening exercises much due to the constipation and cramping but she did perform stretches.       Initial:}     /10Post Session:        /10  Medications Last Reviewed:  2023  Updated Objective Findings:    Ms. Yue Masterson is a 29 yo female referred to pelvic floor physical therapy (PFPT) by FRANCISCO JAVIER Lucio* 2/2 Stress incontinence (female) (male) [N39.3] Pt reports that symptoms began 1 year ago. Patient reports leakage is getting worse especially when she sneezes. Coughing is hit or miss. She feels like she doesn't have control of pelvic floor. She is 3 years post partum. She is in grad school for OT. She started an exercise program and thought she might have a prolapse. Urinary: Frequency 5-8 x/day, 1 x/night. Positive for stress urinary incontinence, incomplete emptying, urinary hesitancy/intermittency, and nocturia. Negative for urge urinary incontinence, urinary urgency, urinary frequency, dysuria, hematuria, and nocturnal enuresis. Pt does not use pads for urinary protection; 0 pads per day (PPD). Fluid intake: 16 oz water/day; bladder irritants include: coffee (4 cups a day). Pt does not limit fluid intake due to bladder control. Bowel: Frequency once to twice a day. Positive for pushing/straining with bowel movement and constipation. Negative for pain with bowel movement, fecal incontinence, and incomplete emptying. Pt does not use pads for bowel protection; 0 pads per day (PPD). Use of stool softeners or laxatives? No     Sexual: Pt is  sexually active with male partners. Contraception/birth control: none. negative for dyspareunia. History of sexual abuse: No     Pelvic Organ Prolapse/Pelvic Pain: Location: none     Menstrual History: LMP: 11-27-22. Period Cycle (days):28 . Period duration (days): 5. Period pattern: regular (regular/irregular). Menstrual flow: moderate to heavy (light/moderate/heavy). OB History: Number of pregnancies: 2 Number of vaginal deliveries: 2 Number of c-sections: 0.   External Observations:  Voluntary contraction: [] absent     [x] present  Involuntary contraction: [] absent     [x] present  Involuntary relaxation: [] absent     [x] present  Perineal descent at rest: [] absent     [x] present  Perineal descent with bearing: [] absent     [x] present        Pelvic Floor Muscle (PFM) Assessment:  Vaginal vault size: [] decreased     [] increased     [x] WNL  Muscle volume: [] decreased     [x] WNL     [] Defect  PFM tension: [] decreased     [x] increased     [] WNL     Contraction ability:  Voluntary contraction: [] absent     [] weak     [x] moderate      [] strong  Voluntary relaxation: [] absent     [] partial     [x] complete   MMT: 3+/5   Overflow: [x] absent     [] min     [] mod     [] severe / Compensatory mm groups include   Levator Avulsion: [x] Negative  [] Positive     Tissue laxity test:  Anterior wall: [] minimum     [] moderate     [] severe      [x] WNL  Posterior wall: [x] minimum     [] moderate     [] severe      [] WNL        Palpation: via vaginal canal   Superficial Pelvic Floor Musculature (PFM): Tender? Intermediate PFM Tender? Deep PFM Tender? Superficial Transverse Perineal [] Right  [] Left  []DNT Deep Transverse Perineal [] Right  [] Left  []DNT Puborectalis [] Right  [] Left  []DNT   Ischiocavernosus [] Right  [] Left  []DNT Compressor Urethra [] Right  [] Left  []DNT Pubococcygeus [] Right  [] Left  []DNT   Bulbocavernosus [] Right  [] Left  []DNT     Ileococcygeus [] Right  [] Left  []DNT           Obturator Internus [] Right  [] Left  []DNT           Coccygeus [] Right  [] Left  []DNT           Treatment   THERAPEUTIC EXERCISE: (55 minutes):    Exercises per grid below to improve mobility, strength, and coordination. Required minimal visual, verbal, manual, and tactile cues to promote proper body alignment, promote proper body posture, promote proper body mechanics, and promote proper body breathing techniques. Progressed resistance, range, repetitions, and complexity of movement as indicated.    Date:  12-19-22 Date:  1-16-23 Date:  1-26-23 Date:  1-31-23 Date:  2-7-23 Date:  2-14-23   Activity/Exercise Parameters Parameters Parameters      Patient Education Discussed HEP and POC Discussed progress, educated on expected leakage with improvement in pelvic floor length/tension   Education provided on glute and core activation, and performance of HEP. Education on stress and constipation. Drops Reviewed for HEP        Butterfly    2 x 30 seconds 2x30 seconds  30 sec holds    Figure 4    2 x 30 seconds B       Happy Baby     2 x 30 seconds  2 x 30 seconds       Gabriella Pose    2 x 30 seconds, all directions   30 sec holds forward and lateral 30 sec holds forward and lateral   Pelvic tilt   X15, 5 sec hold  X20, 5 sec hold    2nd position bridge   X20   X20  2 x 10   Clamshells   X20 B     2 x 10 B   Dead Bug      x20     Bird Dog      X20      Posterior pelvic tilt + march     X20      Prone glute extensions     X10 B   2 X10 B   Rows        2 x 10 blue band   Extensions        2 x 10 blue band   Lateral walks with blue band        Blue band 3 laps      All exercises performed with emphasis on kegel and breathing in order improve coordination, awareness and to minimize symptoms. MANUAL THERAPY: (0 minutes): to improve soft tissue tone    Date Type Location Comments   2/14/2023 Internal assessment/treatment Via vaginal canal                                         (Used abbreviations: MET - muscle energy technique; SCS- Strain counter strain; CTM-Connective tissue mobilizations; CR- Contract/Relax; SP- Sustained pressure; PIT- Positional inhibition techniques; STM Soft -tissue mobilization; MM- Myofascial mobilization; TrP-Trigger point release; IASTM- Instrument assisted soft tissue mobilizations, TDN-Trigger point dry needling)    Pt gives verbal consent to internal vaginal assessment/treatment without chaperon present.     NEURO REEDUCATION: (0 minutes) to improve control and coordination of pelvic floor     Date:  1-12-23 Date:   Date:     Activity/Exercise Parameters Parameters Parameters   Biofeedback With sEMG was utilized for coordination of PFM. Supine, sitting, standing. THERAPEUTIC ACTIVITY: (0 minutes):     TA Educational Topic Notes Date Completed   Pathology/Anatomy/PFM Function     Bladder health education     Urinary urge suppression     The knack     Voiding strategies     Nocturia tips     Bowel/Bladder log     Bowel health education     Constipation care     Diarrhea/Fecal leakage     Colonic massage     Toilet positioning     Defecation dynamics     Sources of fiber     Return to intercourse/Dilator training     Sexual positioning     Lubricants/vaginal moisturizers     Vaginal irritants     Body mechanics     Posture/ergonomics     Diaphragmatic breathing     Resources and technology          Treatment/Session Summary:    Treatment Assessment: Mari Melton demonstrates continues to have difficulty with glute isolation. Shows increased use of back extensors. Difficulty with contraction of PFM in standing. Communication/Consultation:  None today  Equipment provided today:  None  Recommendations/Intent for next treatment session: Next visit will focus on coordination and strengthening. Total Treatment Billable Duration:  55 minutes there ex  Time In: 1400  Time Out: [facility].  Layne Orellana, PT       Charge Capture  }Post Session Pain  PT Visit Info  [facility]  MD Guidelines  Scanned Media  Benefits  MyChart    Future Appointments   Date Time Provider Trudi Orellana   3/9/2023  9:00 AM Jonelle Sharpe PT [facility]   3/27/2023 10:00 AM [facility]
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
## 2023-03-09 ENCOUNTER — HOSPITAL ENCOUNTER (OUTPATIENT)
Dept: PHYSICAL THERAPY | Age: 38
Setting detail: RECURRING SERIES
Discharge: HOME | End: 2023-03-12
Payer: OTHER GOVERNMENT

## 2023-03-09 PROCEDURE — 97110 THERAPEUTIC EXERCISES: CPT

## 2023-03-09 PROCEDURE — 97140 MANUAL THERAPY 1/> REGIONS: CPT

## 2023-03-27 ENCOUNTER — HOSPITAL ENCOUNTER (OUTPATIENT)
Dept: PHYSICAL THERAPY | Age: 38
Setting detail: RECURRING SERIES
End: 2023-03-27
Payer: OTHER GOVERNMENT

## 2023-03-27 NOTE — PROGRESS NOTES
DATE: 3/27/2023    Patient canceled appointment greater than 24 hours notice. Margaret Bliss.  Madalyn Hardin